# Patient Record
Sex: FEMALE | Race: WHITE | NOT HISPANIC OR LATINO
[De-identification: names, ages, dates, MRNs, and addresses within clinical notes are randomized per-mention and may not be internally consistent; named-entity substitution may affect disease eponyms.]

---

## 2019-10-22 ENCOUNTER — APPOINTMENT (OUTPATIENT)
Dept: SURGERY | Facility: CLINIC | Age: 64
End: 2019-10-22
Payer: COMMERCIAL

## 2019-10-22 VITALS
SYSTOLIC BLOOD PRESSURE: 155 MMHG | DIASTOLIC BLOOD PRESSURE: 88 MMHG | WEIGHT: 150 LBS | HEIGHT: 65 IN | HEART RATE: 101 BPM | BODY MASS INDEX: 24.99 KG/M2

## 2019-10-22 DIAGNOSIS — K80.42 CALCULUS OF BILE DUCT WITH ACUTE CHOLECYSTITIS W/OUT OBSTRUCTION: ICD-10-CM

## 2019-10-22 DIAGNOSIS — Z87.19 PERSONAL HISTORY OF OTHER DISEASES OF THE DIGESTIVE SYSTEM: ICD-10-CM

## 2019-10-22 PROBLEM — Z00.00 ENCOUNTER FOR PREVENTIVE HEALTH EXAMINATION: Status: ACTIVE | Noted: 2019-10-22

## 2019-10-22 PROCEDURE — 99024 POSTOP FOLLOW-UP VISIT: CPT

## 2019-10-22 RX ORDER — LOSARTAN POTASSIUM 100 MG/1
TABLET, FILM COATED ORAL
Refills: 0 | Status: ACTIVE | COMMUNITY

## 2019-10-22 RX ORDER — METOPROLOL TARTRATE 75 MG/1
TABLET, FILM COATED ORAL
Refills: 0 | Status: ACTIVE | COMMUNITY

## 2019-10-22 NOTE — HISTORY OF PRESENT ILLNESS
[de-identified] : 64 yr old female with recent history of acute cholecystitis and gangrenous gallbladder.  Underwent Lap tyra with placement of osmin drain by Dr. Bains on 10/17/19.  Did well post op and was discharged on POD #1. States the drain has been draining approx 5 ml daily.  Eating well and tolerating a diet.

## 2019-10-22 NOTE — PHYSICAL EXAM
[de-identified] : NAD [de-identified] : sclera non-icteric [de-identified] : soft, nontender.  some eccymosis in LLQ where heparin shot was given.  Small amt as well around umbilicus.  Dressings removed and Sheldon drain removed by me.  Bandaid placed.

## 2019-10-24 ENCOUNTER — RECORD ABSTRACTING (OUTPATIENT)
Age: 64
End: 2019-10-24

## 2019-10-24 DIAGNOSIS — Z86.79 PERSONAL HISTORY OF OTHER DISEASES OF THE CIRCULATORY SYSTEM: ICD-10-CM
